# Patient Record
(demographics unavailable — no encounter records)

---

## 2025-02-06 NOTE — HISTORY OF PRESENT ILLNESS
[Other: ___] :  blood sugar levels are tested [unfilled] times per day [3] : the pump insertion site is changed every 3 days [Legs] : legs [Abdomen] : abdomen [_____ times per week] : mild symptoms occuring [unfilled] time(s) per week [Glucagon at Home] : has glucagon at home [Previous Hypoglycemic Seizure] : has no history of hypoglycemic seizure [FreeTextEntry2] : Stas is a 19y4m M for follow-up Type 1 DM dx February 20, 2019, no DKA, Gluc>1000, antibody negative, +HLA DR3/DR4. Had very prolonged honeymoon, for a long time needing only basal insulin. Did start requiring multiple daily doses 9/2020.  Stas is on pump therapy with omnipod dash, not closed loop, using dexcom CGM. Last visit Increased increased I:C daytime and overnight SF.  Hashimoto's hypothyroidism diagnosed 1/2020. Taking T4 37.5mcg daily morning, on empty stomach.  Good energy and appetite.  No constipation. No cold/heat intolerance.  No tremors, sweating. No issues focusing.  Exercise - Going to the gym 2h 6x/wk, and basketball 3x/wk.   Shx - second year at [employer], [employer], doing well.  - BLOOD SUGAR TESTING PATTERN: -on Dexcom CGM G6 since 2019 and Omnipod Dash trying to get new OMNI POD 5 with intermittent FS. Insurance will not cover Omnipod 5.  - INSULIN GIVEN BY: patient - MISSED SHOTS: none; denies - TIMES OF HYPERGLYCEMIA: various times ; as per patient especially at night; as per patient after meals and snacks; increased high blood sugar levels as per patient he feels sweaty and very thirsty - TIMES OF HYPOGLYCEMIA:  rarely goes low from workouts since he eats a lot before activity, usually chicken and rice before gym;  if does go low, treats with glucose tabs, juice - legs shaky, hungry ; carries snacks and juice in his bag  - PARENTAL PART IN CARE: independent ; patient tests own bG, patient places own pump and dexcom; changes site PUMP SPECIFIC :Omnipod Dash; insulin used in pump is humalog; change sites every 3 days; no modification during exercise; places on stomach and legs; places dexcom on lower back.  - RECENT ILLNESS: cold - RECENT HOSPITALIZATIONS: none - ACTIVITY LEVEL: goes to gym 2 hours per day x 6 times a week; typically 8pm, time varies depending on schedule; weight lifting; works part time 2x week; starts full time [employer] this week.  - DIABETES EDUCATION TOPICS COVERED:  reviewed rotating injections site, reviewed sick day guidelines, reinforced need to wear diabetes medic alert bracelet or necklace-wears a medic alert necklace. reviewed correct response to hypoglycemia,  Reviewed the meaning and importance of HgbA1C, the effect of exercise on blood sugar - PHYSICIAN REVIEW OF DATA: Provider reviewed pump as well as CGM download for last 2 week(s), highs especially overnight, also higher after carbs afternoon; Target Range: 39%; High:26%; Very High:34%; Low: 1 %; very Low: 0%      Average Glucose: 217   % Time CGM Active: 94.4 %   SD 82  Current regimen: Humalog basal rates:  12 am- 8 am=0.45 u/hr 8 am- 12 am =0.50 u/hr Total=11.6 units  ISF 83km=305 9 am= 120 11pm= 145  Target=120  Insulin to Carb Ratios: 12 am=1: 45 6 am=1: 35  Labs - 1/18/2024: DUE Eye: 2/28/25 appointment Dental - scheduled 3/14/2025 Vaccine - Flu vaccine last visit, Fully vaccinated for COVID 19 Pfizer; booster due CDE 3/25/2021: declines Nutrition - 12/11/2020 PMD: 8/24 Medic Alert: wearing a necklace  Meds:   levothyroxine:  37.5 mcg (takes 1/2 of 75mcg tablet)  albuterol: as needed, rarely Allegra : once daily [FreeTextEntry1] : Place CGM and buttocks.

## 2025-02-06 NOTE — DATA REVIEWED
[FreeTextEntry1] : Labs:	 10/6/23 CBC nl CMP nl Lipids nl UAlb/Cr <11 1/18/24 TSH 1.4, T4 9.9; anti TG negative, anti TPO 34 (H); IgA 366 (N), TTG IgA <2; HbA1c today 7.2, d-stick 138, UA negative with no ketones

## 2025-02-06 NOTE — PHYSICAL EXAM
[Healthy Appearing] : healthy appearing [Well Nourished] : well nourished [Interactive] : interactive [Normal Appearance] : normal appearance [WNL for age] : within normal limits of age [Normal S1 and S2] : normal S1 and S2 [Clear to Ausculation Bilaterally] : clear to auscultation bilaterally [Abdomen Soft] : soft [Abdomen Tenderness] : non-tender [Normal] : grossly intact [Obese] : not obese [Goiter] : no goiter [Murmur] : no murmurs [de-identified] : weight loss 0.5kg/4m  [de-identified] : lipodystrophy L abdomen [de-identified] : normal oropharynx [de-identified] : nonfocal, normal patellar DTRs [de-identified] : normal

## 2025-02-06 NOTE — CONSULT LETTER
[Dear  ___] : Dear  [unfilled], [Courtesy Letter:] : I had the pleasure of seeing your patient, [unfilled], in my office today. [Please see my note below.] : Please see my note below. [Consult Closing:] : Thank you very much for allowing me to participate in the care of this patient.  If you have any questions, please do not hesitate to contact me. [Sincerely,] : Sincerely, [FreeTextEntry3] : Maryan Arredondo MD\par  Pediatric Endocrinology\par  [facility]\par  [facility]\par

## 2025-02-06 NOTE — SCHOOL
[Type 1 Diabetes] : Type 1 Diabetes [3 mg intransal] : 3 mg intransal [___ PROVIDER INITIALS] : : ___[unfilled] [] : _x [Insulin: _____] : Insulin: [unfilled] [_____] : Route: [unfilled] [Dr. Maryan Arredondo] : Dr. Maryan Arredondo - License [id number] [[place] Office] : [address], [address], [place] [address] [[place] Phone #] : Tel. [phone]    Fax. [phone] [Today's Date] : [unfilled] [FreeTextEntry2] : Jerry PATEL [FreeTextEntry4] : 11 [FreeTextEntry6] : 7/26/22 [FreeTextEntry7] : 6.3

## 2025-02-06 NOTE — THERAPY
[Today's Date] : [unfilled] [Humalog] : Humalog [_____] :  [unfilled] units/hour [BG Target = ____] : BG Target = [unfilled] [Insulin Sensitivity Factor = ____] : Insulin Sensitivity Factor = [unfilled] [Insulin on Board (IOB) Duration = ____ hours] : Insulin on Board (IOB) Duration  = [unfilled] hours [FreeTextEntry3] : 12a 40 6a 35 3p 30 6p 35